# Patient Record
Sex: MALE | Race: WHITE | NOT HISPANIC OR LATINO | Employment: FULL TIME | URBAN - NONMETROPOLITAN AREA
[De-identification: names, ages, dates, MRNs, and addresses within clinical notes are randomized per-mention and may not be internally consistent; named-entity substitution may affect disease eponyms.]

---

## 2018-02-21 ENCOUNTER — HOSPITAL ENCOUNTER (OUTPATIENT)
Dept: RADIOLOGY | Facility: HOSPITAL | Age: 18
Discharge: HOME | End: 2018-02-21
Attending: PREVENTIVE MEDICINE
Payer: COMMERCIAL

## 2018-02-21 ENCOUNTER — TRANSCRIBE ORDERS (OUTPATIENT)
Dept: ADMINISTRATIVE | Facility: HOSPITAL | Age: 18
End: 2018-02-21

## 2018-02-21 DIAGNOSIS — Z00.8 SPECIAL EXAMINATIONS: Primary | ICD-10-CM

## 2018-02-21 DIAGNOSIS — Z00.8 SPECIAL EXAMINATIONS: ICD-10-CM

## 2018-02-21 PROCEDURE — 71046 X-RAY EXAM CHEST 2 VIEWS: CPT

## 2018-02-22 ENCOUNTER — TRANSCRIBE ORDERS (OUTPATIENT)
Dept: URGENT CARE | Facility: CLINIC | Age: 18
End: 2018-02-22

## 2020-08-13 ENCOUNTER — HOSPITAL ENCOUNTER (EMERGENCY)
Facility: HOSPITAL | Age: 20
Discharge: HOME | End: 2020-08-13
Attending: EMERGENCY MEDICINE | Admitting: EMERGENCY MEDICINE
Payer: COMMERCIAL

## 2020-08-13 VITALS
HEIGHT: 71 IN | SYSTOLIC BLOOD PRESSURE: 137 MMHG | RESPIRATION RATE: 18 BRPM | BODY MASS INDEX: 29.85 KG/M2 | HEART RATE: 106 BPM | TEMPERATURE: 97.6 F | WEIGHT: 213.19 LBS | OXYGEN SATURATION: 98 % | DIASTOLIC BLOOD PRESSURE: 65 MMHG

## 2020-08-13 DIAGNOSIS — M54.9 BACK PAIN: Primary | ICD-10-CM

## 2020-08-13 PROCEDURE — 99283 EMERGENCY DEPT VISIT LOW MDM: CPT

## 2020-08-13 PROCEDURE — 99284 EMERGENCY DEPT VISIT MOD MDM: CPT | Performed by: EMERGENCY MEDICINE

## 2020-08-13 RX ORDER — DIAZEPAM 5 MG/1
5-10 TABLET ORAL EVERY 6 HOURS PRN
Qty: 15 TABLET | Refills: 0 | Status: SHIPPED | OUTPATIENT
Start: 2020-08-13 | End: 2020-08-23

## 2020-08-13 RX ORDER — CYCLOBENZAPRINE HCL 10 MG
10 TABLET ORAL DAILY
COMMUNITY

## 2020-08-13 RX ORDER — NAPROXEN 375 MG/1
500 TABLET ORAL DAILY PRN
COMMUNITY

## 2020-08-13 RX ORDER — MORPHINE SULFATE 15 MG/1
15 TABLET ORAL EVERY 8 HOURS PRN
Qty: 6 TABLET | Refills: 0 | Status: SHIPPED | OUTPATIENT
Start: 2020-08-13

## 2020-08-13 NOTE — Clinical Note
Jovana Horne was seen and treated in our emergency department on 8/13/2020  Diagnosis:     Teresa Singh    He may return on 08/17/2020  If you have any questions or concerns, please don't hesitate to call        Kaela Kent DO    ______________________________           _______________          _______________  Hospital Representative                              Date                                Time

## 2020-08-13 NOTE — Clinical Note
Lory Bernstein was seen and treated in our emergency department on 8/13/2020  Diagnosis:     Beryle Eldon    He may return on 08/17/2020  If you have any questions or concerns, please don't hesitate to call        Megan Mondragon, DO    ______________________________           _______________          _______________  Hospital Representative                              Date                                Time

## 2020-08-14 NOTE — ED PROVIDER NOTES
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 Taking? PREDNISONE PO 8/13/2020 at Unknown time  Yes Yes   Sig: Take by mouth   cyclobenzaprine (FLEXERIL) 10 mg tablet 8/13/2020 at Unknown time  Yes Yes   Sig: Take 10 mg by mouth daily   naproxen (NAPROSYN) 375 mg tablet 8/13/2020 at Unknown time  Yes Yes   Sig: Take 500 mg by mouth daily as needed for mild pain      Facility-Administered Medications: None       History reviewed  No pertinent past medical history  History reviewed  No pertinent surgical history  History reviewed  No pertinent family history  I have reviewed and agree with the history as documented      E-Cigarette/Vaping    E-Cigarette Use Never User      E-Cigarette/Vaping Substances     Social History     Tobacco Use    Smoking status: Never Smoker    Smokeless tobacco: Never Used   Substance Use Topics    Alcohol use: Never Frequency: Never    Drug use: Never       Review of Systems   Constitutional: Negative for fever  Respiratory: Negative for shortness of breath  Cardiovascular: Negative for chest pain  Gastrointestinal: Negative for abdominal pain, diarrhea, nausea and vomiting  Skin: Negative for rash  Neurological: Negative for headaches  All other systems reviewed and are negative  Physical Exam  Physical Exam  Vitals signs and nursing note reviewed  Constitutional:       Comments: Pleasant, comfortable-appearing   HENT:      Head: Normocephalic and atraumatic  Eyes:      Conjunctiva/sclera: Conjunctivae normal       Pupils: Pupils are equal, round, and reactive to light  Neck:      Musculoskeletal: Neck supple  Cardiovascular:      Rate and Rhythm: Normal rate and regular rhythm  Heart sounds: Normal heart sounds  Pulmonary:      Effort: Pulmonary effort is normal       Breath sounds: Normal breath sounds  Abdominal:      General: Bowel sounds are normal  There is no distension  Palpations: Abdomen is soft  Tenderness: There is no abdominal tenderness  Musculoskeletal: Normal range of motion  Comments: Bilateral thoracolumbar junction area para spinal muscular tenderness, no spinal tenderness or deformity   Skin:     General: Skin is warm and dry  Neurological:      Mental Status: He is alert and oriented to person, place, and time  Cranial Nerves: No cranial nerve deficit  Coordination: Coordination normal       Comments: Deep tendon reflexes 2/4 at knees and ankles bilaterally  Intact saddle area sensation   Psychiatric:         Behavior: Behavior normal          Thought Content:  Thought content normal          Judgment: Judgment normal          Vital Signs  ED Triage Vitals [08/13/20 2003]   Temperature Pulse Respirations Blood Pressure SpO2   97 6 °F (36 4 °C) (!) 112 20 150/70 100 %      Temp Source Heart Rate Source Patient Position - Orthostatic VS BP Location FiO2 (%)   Temporal Monitor Lying Right arm --      Pain Score       9           Vitals:    08/13/20 2003 08/13/20 2030   BP: 150/70 137/65   Pulse: (!) 112 (!) 106   Patient Position - Orthostatic VS: Lying Lying         Visual Acuity      ED Medications  Medications - No data to display    Diagnostic Studies  Results Reviewed     None                 No orders to display              Procedures  Procedures         ED Course       US AUDIT      Most Recent Value   Initial Alcohol Screen: US AUDIT-C    1  How often do you have a drink containing alcohol?  0 Filed at: 08/13/2020 2003   2  How many drinks containing alcohol do you have on a typical day you are drinking? 0 Filed at: 08/13/2020 2003   3a  Male UNDER 65: How often do you have five or more drinks on one occasion? 0 Filed at: 08/13/2020 2003   3b  FEMALE Any Age, or MALE 65+: How often do you have 4 or more drinks on one occassion? 0 Filed at: 08/13/2020 2003   Audit-C Score  0 Filed at: 08/13/2020 2003                  WHITNEY/DAST-10      Most Recent Value   How many times in the past year have you    Used an illegal drug or used a prescription medication for non-medical reasons? Never Filed at: 08/13/2020 2003                                MDM      Disposition  Final diagnoses:   Back pain     Time reflects when diagnosis was documented in both MDM as applicable and the Disposition within this note     Time User Action Codes Description Comment    8/13/2020  8:32 PM Jennifer Brunson Add [M54 9] Back pain       ED Disposition     ED Disposition Condition Date/Time Comment    Discharge Stable Thu Aug 13, 2020  8:32 PM Rain Brown discharge to home/self care              Follow-up Information     Follow up With Specialties Details Why Contact Info    Janie Fuchs DO Family Medicine Schedule an appointment as soon as possible for a visit in 1 week  [facility]  [phone]            Discharge Medication List as of 8/13/2020  8:42 PM      START taking these medications    Details   diazepam (VALIUM) 5 mg tablet Take 1-2 tablets (5-10 mg total) by mouth every 6 (six) hours as needed for muscle spasms for up to 10 days, Starting Thu 8/13/2020, Until Sun 8/23/2020, Normal      morphine (MSIR) 15 mg tablet Take 1 tablet (15 mg total) by mouth every 8 (eight) hours as needed for severe pain for up to 6 dosesMax Daily Amount: 45 mg, Starting Thu 8/13/2020, Normal         CONTINUE these medications which have NOT CHANGED    Details   cyclobenzaprine (FLEXERIL) 10 mg tablet Take 10 mg by mouth daily, Historical Med      naproxen (NAPROSYN) 375 mg tablet Take 500 mg by mouth daily as needed for mild pain, Historical Med      PREDNISONE PO Take by mouth, Historical Med           No discharge procedures on file      PDMP Review     None          ED Provider  Electronically Signed by           Huey Salgado DO  08/13/20 7535

## 2020-08-14 NOTE — ED NOTES
Pt ambulates with a steady gait   Verbalizes understanding of discharge instructions provided by dr Emerald Bustos, RN  08/13/20 2044

## 2024-03-04 ENCOUNTER — APPOINTMENT (EMERGENCY)
Dept: CT IMAGING | Facility: HOSPITAL | Age: 24
End: 2024-03-04
Payer: COMMERCIAL

## 2024-03-04 ENCOUNTER — HOSPITAL ENCOUNTER (EMERGENCY)
Facility: HOSPITAL | Age: 24
Discharge: HOME | End: 2024-03-04
Attending: EMERGENCY MEDICINE | Admitting: EMERGENCY MEDICINE
Payer: COMMERCIAL

## 2024-03-04 ENCOUNTER — APPOINTMENT (EMERGENCY)
Dept: RADIOLOGY | Facility: HOSPITAL | Age: 24
End: 2024-03-04
Payer: COMMERCIAL

## 2024-03-04 VITALS
HEART RATE: 110 BPM | RESPIRATION RATE: 16 BRPM | SYSTOLIC BLOOD PRESSURE: 101 MMHG | OXYGEN SATURATION: 98 % | WEIGHT: 217.15 LBS | TEMPERATURE: 98.6 F | HEIGHT: 69 IN | DIASTOLIC BLOOD PRESSURE: 51 MMHG | BODY MASS INDEX: 32.16 KG/M2

## 2024-03-04 DIAGNOSIS — R19.7 DIARRHEA: Primary | ICD-10-CM

## 2024-03-04 DIAGNOSIS — R11.2 NAUSEA & VOMITING: ICD-10-CM

## 2024-03-04 DIAGNOSIS — R10.9 ABDOMINAL PAIN: ICD-10-CM

## 2024-03-04 LAB
ALBUMIN SERPL BCP-MCNC: 4.1 G/DL (ref 3.5–5)
ALP SERPL-CCNC: 93 U/L (ref 34–104)
ALT SERPL W P-5'-P-CCNC: 26 U/L (ref 7–52)
ANION GAP SERPL CALCULATED.3IONS-SCNC: 8 MMOL/L
AST SERPL W P-5'-P-CCNC: 19 U/L (ref 13–39)
BASOPHILS # BLD AUTO: 0.02 THOUSANDS/ÂΜL (ref 0–0.1)
BASOPHILS NFR BLD AUTO: 0 % (ref 0–1)
BILIRUB SERPL-MCNC: 1.08 MG/DL (ref 0.2–1)
BUN SERPL-MCNC: 12 MG/DL (ref 5–25)
CALCIUM SERPL-MCNC: 8.8 MG/DL (ref 8.4–10.2)
CHLORIDE SERPL-SCNC: 110 MMOL/L (ref 96–108)
CO2 SERPL-SCNC: 21 MMOL/L (ref 21–32)
CREAT SERPL-MCNC: 1 MG/DL (ref 0.6–1.3)
EOSINOPHIL # BLD AUTO: 0.08 THOUSAND/ÂΜL (ref 0–0.61)
EOSINOPHIL NFR BLD AUTO: 1 % (ref 0–6)
ERYTHROCYTE [DISTWIDTH] IN BLOOD BY AUTOMATED COUNT: 12.1 % (ref 11.6–15.1)
GFR SERPL CREATININE-BSD FRML MDRD: 104 ML/MIN/1.73SQ M
GLUCOSE SERPL-MCNC: 103 MG/DL (ref 65–140)
HCT VFR BLD AUTO: 44.1 % (ref 36.5–49.3)
HGB BLD-MCNC: 15.1 G/DL (ref 12–17)
IMM GRANULOCYTES # BLD AUTO: 0.03 THOUSAND/UL (ref 0–0.2)
IMM GRANULOCYTES NFR BLD AUTO: 0 % (ref 0–2)
LIPASE SERPL-CCNC: 14 U/L (ref 11–82)
LYMPHOCYTES # BLD AUTO: 0.57 THOUSANDS/ÂΜL (ref 0.6–4.47)
LYMPHOCYTES NFR BLD AUTO: 6 % (ref 14–44)
MCH RBC QN AUTO: 30.9 PG (ref 26.8–34.3)
MCHC RBC AUTO-ENTMCNC: 34.2 G/DL (ref 31.4–37.4)
MCV RBC AUTO: 90 FL (ref 82–98)
MONOCYTES # BLD AUTO: 0.38 THOUSAND/ÂΜL (ref 0.17–1.22)
MONOCYTES NFR BLD AUTO: 4 % (ref 4–12)
NEUTROPHILS # BLD AUTO: 7.76 THOUSANDS/ÂΜL (ref 1.85–7.62)
NEUTS SEG NFR BLD AUTO: 89 % (ref 43–75)
NRBC BLD AUTO-RTO: 0 /100 WBCS
PLATELET # BLD AUTO: 153 THOUSANDS/UL (ref 149–390)
PMV BLD AUTO: 11.3 FL (ref 8.9–12.7)
POTASSIUM SERPL-SCNC: 3.8 MMOL/L (ref 3.5–5.3)
PROT SERPL-MCNC: 6.7 G/DL (ref 6.4–8.4)
RBC # BLD AUTO: 4.88 MILLION/UL (ref 3.88–5.62)
SODIUM SERPL-SCNC: 139 MMOL/L (ref 135–147)
WBC # BLD AUTO: 8.84 THOUSAND/UL (ref 4.31–10.16)

## 2024-03-04 PROCEDURE — 99285 EMERGENCY DEPT VISIT HI MDM: CPT | Performed by: EMERGENCY MEDICINE

## 2024-03-04 PROCEDURE — 74022 RADEX COMPL AQT ABD SERIES: CPT

## 2024-03-04 PROCEDURE — 96374 THER/PROPH/DIAG INJ IV PUSH: CPT

## 2024-03-04 PROCEDURE — 83690 ASSAY OF LIPASE: CPT | Performed by: EMERGENCY MEDICINE

## 2024-03-04 PROCEDURE — 80053 COMPREHEN METABOLIC PANEL: CPT | Performed by: EMERGENCY MEDICINE

## 2024-03-04 PROCEDURE — 85025 COMPLETE CBC W/AUTO DIFF WBC: CPT | Performed by: EMERGENCY MEDICINE

## 2024-03-04 PROCEDURE — 96375 TX/PRO/DX INJ NEW DRUG ADDON: CPT

## 2024-03-04 PROCEDURE — 96361 HYDRATE IV INFUSION ADD-ON: CPT

## 2024-03-04 PROCEDURE — 36415 COLL VENOUS BLD VENIPUNCTURE: CPT | Performed by: EMERGENCY MEDICINE

## 2024-03-04 PROCEDURE — 99284 EMERGENCY DEPT VISIT MOD MDM: CPT

## 2024-03-04 PROCEDURE — 74177 CT ABD & PELVIS W/CONTRAST: CPT

## 2024-03-04 RX ORDER — ONDANSETRON 2 MG/ML
4 INJECTION INTRAMUSCULAR; INTRAVENOUS ONCE
Status: COMPLETED | OUTPATIENT
Start: 2024-03-04 | End: 2024-03-04

## 2024-03-04 RX ORDER — OXYCODONE HYDROCHLORIDE AND ACETAMINOPHEN 5; 325 MG/1; MG/1
1 TABLET ORAL EVERY 8 HOURS PRN
Qty: 12 TABLET | Refills: 0 | Status: SHIPPED | OUTPATIENT
Start: 2024-03-04 | End: 2024-03-08

## 2024-03-04 RX ORDER — LOPERAMIDE HYDROCHLORIDE 2 MG/1
2 CAPSULE ORAL 4 TIMES DAILY PRN
Qty: 12 CAPSULE | Refills: 0 | Status: SHIPPED | OUTPATIENT
Start: 2024-03-04

## 2024-03-04 RX ORDER — DROPERIDOL 2.5 MG/ML
1 INJECTION, SOLUTION INTRAMUSCULAR; INTRAVENOUS ONCE
Status: COMPLETED | OUTPATIENT
Start: 2024-03-04 | End: 2024-03-04

## 2024-03-04 RX ORDER — DICYCLOMINE HCL 20 MG
20 TABLET ORAL 2 TIMES DAILY
Qty: 20 TABLET | Refills: 0 | Status: SHIPPED | OUTPATIENT
Start: 2024-03-04

## 2024-03-04 RX ORDER — FAMOTIDINE 10 MG/ML
20 INJECTION, SOLUTION INTRAVENOUS ONCE
Status: COMPLETED | OUTPATIENT
Start: 2024-03-04 | End: 2024-03-04

## 2024-03-04 RX ORDER — ONDANSETRON 2 MG/ML
1 INJECTION INTRAMUSCULAR; INTRAVENOUS ONCE
Status: COMPLETED | OUTPATIENT
Start: 2024-03-04 | End: 2024-03-04

## 2024-03-04 RX ORDER — PANTOPRAZOLE SODIUM 40 MG/1
40 TABLET, DELAYED RELEASE ORAL DAILY
COMMUNITY
Start: 2023-12-19

## 2024-03-04 RX ORDER — HYDROMORPHONE HCL/PF 1 MG/ML
1 SYRINGE (ML) INJECTION ONCE
Status: COMPLETED | OUTPATIENT
Start: 2024-03-04 | End: 2024-03-04

## 2024-03-04 RX ORDER — ONDANSETRON 4 MG/1
4 TABLET, FILM COATED ORAL EVERY 6 HOURS
Qty: 20 TABLET | Refills: 0 | Status: SHIPPED | OUTPATIENT
Start: 2024-03-04

## 2024-03-04 RX ORDER — DROPERIDOL 2.5 MG/ML
0.62 INJECTION, SOLUTION INTRAMUSCULAR; INTRAVENOUS ONCE
Status: COMPLETED | OUTPATIENT
Start: 2024-03-04 | End: 2024-03-04

## 2024-03-04 RX ADMIN — FAMOTIDINE 20 MG: 10 INJECTION, SOLUTION INTRAVENOUS at 09:38

## 2024-03-04 RX ADMIN — SODIUM CHLORIDE 1000 ML: 0.9 INJECTION, SOLUTION INTRAVENOUS at 09:18

## 2024-03-04 RX ADMIN — ONDANSETRON 4 MG: 2 INJECTION INTRAMUSCULAR; INTRAVENOUS at 09:22

## 2024-03-04 RX ADMIN — MORPHINE SULFATE 2 MG: 2 INJECTION, SOLUTION INTRAMUSCULAR; INTRAVENOUS at 09:38

## 2024-03-04 RX ADMIN — HYDROMORPHONE HYDROCHLORIDE 1 MG: 1 INJECTION, SOLUTION INTRAMUSCULAR; INTRAVENOUS; SUBCUTANEOUS at 10:33

## 2024-03-04 RX ADMIN — IOHEXOL 100 ML: 350 INJECTION, SOLUTION INTRAVENOUS at 12:03

## 2024-03-04 RX ADMIN — DROPERIDOL 0.62 MG: 2.5 INJECTION, SOLUTION INTRAMUSCULAR; INTRAVENOUS at 10:33

## 2024-03-04 NOTE — ED PROVIDER NOTES
History  Chief Complaint   Patient presents with    Vomiting     Patient presents via EMS with chills, vomiting, diarrhea abdominal pain starting this morning. Patient recently exposed to norovirus.     23-year-old male presents to the ED for evaluation of chills, nausea, vomiting and abdominal pain that began this morning.  Patient states that he has not had a fever however has severe nonradiating abdominal pain.  Patient states his pain is 10 out of 10 and admits to myalgia as well.  Recent exposure to norovirus and his family.        Prior to Admission Medications   Prescriptions Last Dose Informant Patient Reported? Taking?   PREDNISONE PO Not Taking  Yes No   Sig: Take by mouth   Patient not taking: Reported on 3/4/2024   cyclobenzaprine (FLEXERIL) 10 mg tablet Unknown  Yes No   Sig: Take 10 mg by mouth daily   diazepam (VALIUM) 5 mg tablet   No No   Sig: Take 1-2 tablets (5-10 mg total) by mouth every 6 (six) hours as needed for muscle spasms for up to 10 days   morphine (MSIR) 15 mg tablet Not Taking  No No   Sig: Take 1 tablet (15 mg total) by mouth every 8 (eight) hours as needed for severe pain for up to 6 dosesMax Daily Amount: 45 mg   Patient not taking: Reported on 3/4/2024   naproxen (NAPROSYN) 375 mg tablet Not Taking  Yes No   Sig: Take 500 mg by mouth daily as needed for mild pain   Patient not taking: Reported on 3/4/2024   pantoprazole (PROTONIX) 40 mg tablet 3/3/2024  Yes Yes   Sig: Take 40 mg by mouth daily      Facility-Administered Medications: None       History reviewed. No pertinent past medical history.    History reviewed. No pertinent surgical history.    History reviewed. No pertinent family history.  I have reviewed and agree with the history as documented.    E-Cigarette/Vaping    E-Cigarette Use Never User      E-Cigarette/Vaping Substances     Social History     Tobacco Use    Smoking status: Never    Smokeless tobacco: Never   Vaping Use    Vaping status: Never Used   Substance Use  Topics    Alcohol use: Never    Drug use: Never       Review of Systems   Constitutional:  Positive for chills. Negative for fever.   HENT:  Negative for ear pain and sore throat.    Eyes:  Negative for pain and visual disturbance.   Respiratory:  Negative for cough and shortness of breath.    Cardiovascular:  Negative for chest pain and palpitations.   Gastrointestinal:  Positive for diarrhea, nausea and vomiting. Negative for abdominal pain.   Genitourinary:  Negative for dysuria and hematuria.   Musculoskeletal:  Positive for myalgias. Negative for arthralgias and back pain.   Skin:  Negative for color change and rash.   Neurological:  Negative for seizures and syncope.   All other systems reviewed and are negative.      Physical Exam  Physical Exam  Vitals and nursing note reviewed.   Constitutional:       General: He is in acute distress.      Appearance: Normal appearance. He is well-developed and normal weight. He is ill-appearing.   HENT:      Head: Normocephalic and atraumatic.      Right Ear: External ear normal.      Left Ear: External ear normal.      Nose: Nose normal. No congestion or rhinorrhea.      Mouth/Throat:      Mouth: Mucous membranes are dry.   Eyes:      Extraocular Movements: Extraocular movements intact.      Conjunctiva/sclera: Conjunctivae normal.   Neck:      Comments: +bilateral paraspinal tenderness  Cardiovascular:      Rate and Rhythm: Normal rate and regular rhythm.      Heart sounds: No murmur heard.  Pulmonary:      Effort: Pulmonary effort is normal. No respiratory distress.      Breath sounds: Normal breath sounds.   Abdominal:      General: Abdomen is flat. Bowel sounds are normal.      Palpations: Abdomen is soft.      Tenderness: There is no abdominal tenderness.   Musculoskeletal:         General: No swelling. Normal range of motion.      Cervical back: Normal range of motion and neck supple. Tenderness present.   Skin:     General: Skin is warm and dry.      Capillary  Refill: Capillary refill takes less than 2 seconds.   Neurological:      General: No focal deficit present.      Mental Status: He is alert and oriented to person, place, and time.   Psychiatric:         Mood and Affect: Mood normal.         Vital Signs  ED Triage Vitals   Temperature Pulse Respirations Blood Pressure SpO2   03/04/24 0917 03/04/24 0915 03/04/24 0915 03/04/24 0915 03/04/24 0915   98.6 °F (37 °C) 93 20 122/60 97 %      Temp Source Heart Rate Source Patient Position - Orthostatic VS BP Location FiO2 (%)   03/04/24 0917 03/04/24 0915 03/04/24 0915 -- --   Temporal Monitor Lying        Pain Score       03/04/24 0914       8           Vitals:    03/04/24 1300 03/04/24 1315 03/04/24 1330 03/04/24 1400   BP: 104/51 115/57 107/54 101/51   Pulse: (!) 110 (!) 108 (!) 111 (!) 110   Patient Position - Orthostatic VS:   Lying          Visual Acuity      ED Medications  Medications   droperidol (FOR EMS ONLY) (INAPSINE) 2.5 mg/mL injection 2.5 mg (0 mg Does not apply Given to EMS 3/4/24 0915)   ondansetron (FOR EMS ONLY) (ZOFRAN) 4 mg/2 mL injection 4 mg (0 mg Does not apply Given to EMS 3/4/24 0915)   ondansetron (ZOFRAN) injection 4 mg (4 mg Intravenous Given 3/4/24 0922)   sodium chloride 0.9 % bolus 1,000 mL (0 mL Intravenous Stopped 3/4/24 1203)   morphine injection 2 mg (2 mg Intravenous Given 3/4/24 0938)   Famotidine (PF) (PEPCID) injection 20 mg (20 mg Intravenous Given 3/4/24 0938)   HYDROmorphone (DILAUDID) injection 1 mg (1 mg Intravenous Given 3/4/24 1033)   droperidol (INAPSINE) injection 0.625 mg (0.625 mg Intravenous Given 3/4/24 1033)   iohexol (OMNIPAQUE) 350 MG/ML injection (MULTI-DOSE) 100 mL (100 mL Intravenous Given 3/4/24 1203)       Diagnostic Studies  Results Reviewed       Procedure Component Value Units Date/Time    Lipase [[id number]]  (Normal) Collected: 03/04/24 0918    Lab Status: Final result Specimen: Blood from Arm, Right Updated: 03/04/24 0953     Lipase 14 u/L     CMP [[id number]]   (Abnormal) Collected: 03/04/24 0918    Lab Status: Final result Specimen: Blood from Arm, Right Updated: 03/04/24 0953     Sodium 139 mmol/L      Potassium 3.8 mmol/L      Chloride 110 mmol/L      CO2 21 mmol/L      ANION GAP 8 mmol/L      BUN 12 mg/dL      Creatinine 1.00 mg/dL      Glucose 103 mg/dL      Calcium 8.8 mg/dL      AST 19 U/L      ALT 26 U/L      Alkaline Phosphatase 93 U/L      Total Protein 6.7 g/dL      Albumin 4.1 g/dL      Total Bilirubin 1.08 mg/dL      eGFR 104 ml/min/1.73sq m     Narrative:      National Kid[employer] guidelines for Chronic Kidney Disease (CKD):     Stage 1 with normal or high GFR (GFR > 90 mL/min/1.73 square meters)    Stage 2 Mild CKD (GFR = 60-89 mL/min/1.73 square meters)    Stage 3A Moderate CKD (GFR = 45-59 mL/min/1.73 square meters)    Stage 3B Moderate CKD (GFR = 30-44 mL/min/1.73 square meters)    Stage 4 Severe CKD (GFR = 15-29 mL/min/1.73 square meters)    Stage 5 End Stage CKD (GFR <15 mL/min/1.73 square meters)  Note: GFR calculation is accurate only with a steady state creatinine    Stool Enteric Bacterial Panel by PCR [[id number]]     Lab Status: No result Specimen: Stool     CBC and differential [[id number]]  (Abnormal) Collected: 03/04/24 0918    Lab Status: Final result Specimen: Blood from Arm, Right Updated: 03/04/24 0923     WBC 8.84 Thousand/uL      RBC 4.88 Million/uL      Hemoglobin 15.1 g/dL      Hematocrit 44.1 %      MCV 90 fL      MCH 30.9 pg      MCHC 34.2 g/dL      RDW 12.1 %      MPV 11.3 fL      Platelets 153 Thousands/uL      nRBC 0 /100 WBCs      Neutrophils Relative 89 %      Immat GRANS % 0 %      Lymphocytes Relative 6 %      Monocytes Relative 4 %      Eosinophils Relative 1 %      Basophils Relative 0 %      Neutrophils Absolute 7.76 Thousands/µL      Immature Grans Absolute 0.03 Thousand/uL      Lymphocytes Absolute 0.57 Thousands/µL      Monocytes Absolute 0.38 Thousand/µL      Eosinophils Absolute 0.08 Thousand/µL       Basophils Absolute 0.02 Thousands/µL                    CT abdomen pelvis with contrast   Final Result by Franklin Almendarez MD (03/04 1248)      1.  No acute inflammatory process in the abdomen or pelvis. Mild fluid in distal small bowel and proximal colon is likely related to diarrheal illness.      2.  Submucosal fat deposition in the distal ileum/terminal ileum. This finding is nonspecific though can be seen in the setting of prior or chronic enteritis including inflammatory bowel disease though there is no evidence of active inflammation.         Workstation performed: [id number]         X-ray abdomen obstruction series   Final Result by Rolly Brower MD (03/04 1120)      Nonobstructive bowel gas pattern.         Workstation performed: [id number]                    Procedures  Procedures         ED Course  ED Course as of 03/04/24 2142   Mon Mar 04, 2024   1353 Patient feels improved and ready to be discharged                               SBIRT 22yo+      Flowsheet Row Most Recent Value   Initial Alcohol Screen: US AUDIT-C     1. How often do you have a drink containing alcohol? 0 Filed at: 03/04/2024 0915   2. How many drinks containing alcohol do you have on a typical day you are drinking?  0 Filed at: 03/04/2024 0915   3a. Male UNDER 65: How often do you have five or more drinks on one occasion? 0 Filed at: 03/04/2024 0915   3b. FEMALE Any Age, or MALE 65+: How often do you have 4 or more drinks on one occassion? 0 Filed at: 03/04/2024 0915   Audit-C Score 0 Filed at: 03/04/2024 0915   WHITNEY: How many times in the past year have you...    Used an illegal drug or used a prescription medication for non-medical reasons? Never Filed at: 03/04/2024 0915                      Medical Decision Making  23-year-old male presents to the ED for evaluation of chills, nausea, vomiting and abdominal pain that began this morning.  Patient states that he has not had a fever however has severe nonradiating abdominal  pain.  Patient states his pain is 10 out of 10 and admits to myalgia as well.  Recent exposure to norovirus and his family.  Differential diagnosis includes but not limited to gastroenteritis, gastritis, dehydration, electrolyte abnormality    Amount and/or Complexity of Data Reviewed  Labs: ordered.  Radiology: ordered.    Risk  Prescription drug management.             Disposition  Final diagnoses:   Diarrhea   Nausea & vomiting   Abdominal pain     Time reflects when diagnosis was documented in both MDM as applicable and the Disposition within this note       Time User Action Codes Description Comment    3/4/2024  1:53 PM ThorSonia larsenia Add [R19.7] Diarrhea     3/4/2024  1:53 PM Thorpe Dacandiia Add [R11.2] Nausea & vomiting     3/4/2024  1:54 PM ThorSonia larsenia Add [R10.9] Abdominal pain           ED Disposition       ED Disposition   Discharge    Condition   Stable    Date/Time   Mon Mar 4, 2024 1353    Comment   Jon Bang discharge to home/self care.                   Follow-up Information       Follow up With Specialties Details Why Contact Info    Kodak Santiago DO Family [address] [address] [phone]              Discharge Medication List as of 3/4/2024  1:56 PM        START taking these medications    Details   dicyclomine (BENTYL) 20 mg tablet Take 1 tablet (20 mg total) by mouth 2 (two) times a day, Starting Mon 3/4/2024, Normal      loperamide (IMODIUM) 2 mg capsule Take 1 capsule (2 mg total) by mouth 4 (four) times a day as needed for diarrhea, Starting Mon 3/4/2024, Normal      ondansetron (ZOFRAN) 4 mg tablet Take 1 tablet (4 mg total) by mouth every 6 (six) hours, Starting Mon 3/4/2024, Normal      oxyCODONE-acetaminophen (Percocet) 5-325 mg per tablet Take 1 tablet by mouth every 8 (eight) hours as needed for severe pain for up to 4 days Max Daily Amount: 3 tablets, Starting Mon 3/4/2024, Until Fri 3/8/2024 at 2359, Normal           CONTINUE these  medications which have NOT CHANGED    Details   pantoprazole (PROTONIX) 40 mg tablet Take 40 mg by mouth daily, Starting Tue 12/19/2023, Historical Med      cyclobenzaprine (FLEXERIL) 10 mg tablet Take 10 mg by mouth daily, Historical Med      diazepam (VALIUM) 5 mg tablet Take 1-2 tablets (5-10 mg total) by mouth every 6 (six) hours as needed for muscle spasms for up to 10 days, Starting Thu 8/13/2020, Until Sun 8/23/2020, Normal      morphine (MSIR) 15 mg tablet Take 1 tablet (15 mg total) by mouth every 8 (eight) hours as needed for severe pain for up to 6 dosesMax Daily Amount: 45 mg, Starting u 8/13/2020, Normal      naproxen (NAPROSYN) 375 mg tablet Take 500 mg by mouth daily as needed for mild pain, Historical Med      PREDNISONE PO Take by mouth, Historical Med             No discharge procedures on file.    PDMP Review       None            ED Provider  Electronically Signed by             Beth Gonzalez DO  03/04/24 9280

## 2024-03-04 NOTE — Clinical Note
accompanied Jon Bang to the emergency department on 3/4/2024.    Return date if applicable: 03/05/2024        If you have any questions or concerns, please don't hesitate to call.      Beth Gonzalez, DO

## 2024-03-04 NOTE — Clinical Note
Jon Bang was seen and treated in our emergency department on 3/4/2024.                Diagnosis:     Jon  may return to work on return date.    He may return on this date: 03/07/2024         If you have any questions or concerns, please don't hesitate to call.      Beth Gonzalez, DO    ______________________________           _______________          _______________  Hospital Representative                              Date                                Time

## 2024-12-02 ENCOUNTER — APPOINTMENT (EMERGENCY)
Dept: CT IMAGING | Facility: HOSPITAL | Age: 24
End: 2024-12-02
Payer: COMMERCIAL

## 2024-12-02 ENCOUNTER — HOSPITAL ENCOUNTER (EMERGENCY)
Facility: HOSPITAL | Age: 24
Discharge: HOME | End: 2024-12-02
Attending: EMERGENCY MEDICINE
Payer: COMMERCIAL

## 2024-12-02 VITALS
DIASTOLIC BLOOD PRESSURE: 56 MMHG | OXYGEN SATURATION: 98 % | BODY MASS INDEX: 31.01 KG/M2 | SYSTOLIC BLOOD PRESSURE: 105 MMHG | HEART RATE: 82 BPM | RESPIRATION RATE: 18 BRPM | WEIGHT: 210 LBS

## 2024-12-02 DIAGNOSIS — R55 NEAR SYNCOPE: Primary | ICD-10-CM

## 2024-12-02 LAB
ALBUMIN SERPL BCG-MCNC: 4.7 G/DL (ref 3.5–5)
ALP SERPL-CCNC: 108 U/L (ref 34–104)
ALT SERPL W P-5'-P-CCNC: 40 U/L (ref 7–52)
ANION GAP SERPL CALCULATED.3IONS-SCNC: 10 MMOL/L (ref 4–13)
AST SERPL W P-5'-P-CCNC: 33 U/L (ref 13–39)
BASOPHILS # BLD AUTO: 0.04 THOUSANDS/ΜL (ref 0–0.1)
BASOPHILS NFR BLD AUTO: 1 % (ref 0–1)
BILIRUB SERPL-MCNC: 0.65 MG/DL (ref 0.2–1)
BNP SERPL-MCNC: 18 PG/ML (ref 0–100)
BUN SERPL-MCNC: 12 MG/DL (ref 5–25)
CALCIUM SERPL-MCNC: 9.2 MG/DL (ref 8.4–10.2)
CARDIAC TROPONIN I PNL SERPL HS: <2 NG/L (ref ?–50)
CHLORIDE SERPL-SCNC: 104 MMOL/L (ref 96–108)
CO2 SERPL-SCNC: 25 MMOL/L (ref 21–32)
CREAT SERPL-MCNC: 1.1 MG/DL (ref 0.6–1.3)
EOSINOPHIL # BLD AUTO: 0.07 THOUSAND/ΜL (ref 0–0.61)
EOSINOPHIL NFR BLD AUTO: 1 % (ref 0–6)
ERYTHROCYTE [DISTWIDTH] IN BLOOD BY AUTOMATED COUNT: 12.3 % (ref 11.6–15.1)
GFR SERPL CREATININE-BSD FRML MDRD: 92 ML/MIN/1.73SQ M
GLUCOSE SERPL-MCNC: 135 MG/DL (ref 65–140)
GLUCOSE SERPL-MCNC: 137 MG/DL (ref 65–140)
HCT VFR BLD AUTO: 45.9 % (ref 36.5–49.3)
HGB BLD-MCNC: 15.6 G/DL (ref 12–17)
IMM GRANULOCYTES # BLD AUTO: 0.01 THOUSAND/UL (ref 0–0.2)
IMM GRANULOCYTES NFR BLD AUTO: 0 % (ref 0–2)
LYMPHOCYTES # BLD AUTO: 2.03 THOUSANDS/ΜL (ref 0.6–4.47)
LYMPHOCYTES NFR BLD AUTO: 31 % (ref 14–44)
MCH RBC QN AUTO: 31.3 PG (ref 26.8–34.3)
MCHC RBC AUTO-ENTMCNC: 34 G/DL (ref 31.4–37.4)
MCV RBC AUTO: 92 FL (ref 82–98)
MONOCYTES # BLD AUTO: 0.56 THOUSAND/ΜL (ref 0.17–1.22)
MONOCYTES NFR BLD AUTO: 9 % (ref 4–12)
NEUTROPHILS # BLD AUTO: 3.85 THOUSANDS/ΜL (ref 1.85–7.62)
NEUTS SEG NFR BLD AUTO: 58 % (ref 43–75)
NRBC BLD AUTO-RTO: 0 /100 WBCS
PLATELET # BLD AUTO: 220 THOUSANDS/UL (ref 149–390)
PMV BLD AUTO: 11.6 FL (ref 8.9–12.7)
POTASSIUM SERPL-SCNC: 3.5 MMOL/L (ref 3.5–5.3)
PROT SERPL-MCNC: 7.5 G/DL (ref 6.4–8.4)
RBC # BLD AUTO: 4.98 MILLION/UL (ref 3.88–5.62)
SODIUM SERPL-SCNC: 139 MMOL/L (ref 135–147)
WBC # BLD AUTO: 6.56 THOUSAND/UL (ref 4.31–10.16)

## 2024-12-02 PROCEDURE — 70450 CT HEAD/BRAIN W/O DYE: CPT

## 2024-12-02 PROCEDURE — 80053 COMPREHEN METABOLIC PANEL: CPT | Performed by: EMERGENCY MEDICINE

## 2024-12-02 PROCEDURE — 85025 COMPLETE CBC W/AUTO DIFF WBC: CPT | Performed by: EMERGENCY MEDICINE

## 2024-12-02 PROCEDURE — 82948 REAGENT STRIP/BLOOD GLUCOSE: CPT

## 2024-12-02 PROCEDURE — 83880 ASSAY OF NATRIURETIC PEPTIDE: CPT | Performed by: EMERGENCY MEDICINE

## 2024-12-02 PROCEDURE — 12001 RPR S/N/AX/GEN/TRNK 2.5CM/<: CPT | Performed by: EMERGENCY MEDICINE

## 2024-12-02 PROCEDURE — 36415 COLL VENOUS BLD VENIPUNCTURE: CPT | Performed by: EMERGENCY MEDICINE

## 2024-12-02 PROCEDURE — 84484 ASSAY OF TROPONIN QUANT: CPT | Performed by: EMERGENCY MEDICINE

## 2024-12-02 PROCEDURE — 99284 EMERGENCY DEPT VISIT MOD MDM: CPT | Performed by: EMERGENCY MEDICINE

## 2024-12-02 PROCEDURE — 96360 HYDRATION IV INFUSION INIT: CPT

## 2024-12-02 PROCEDURE — 96361 HYDRATE IV INFUSION ADD-ON: CPT

## 2024-12-02 PROCEDURE — 99284 EMERGENCY DEPT VISIT MOD MDM: CPT

## 2024-12-02 PROCEDURE — 93005 ELECTROCARDIOGRAM TRACING: CPT

## 2024-12-02 RX ADMIN — SODIUM CHLORIDE 2000 ML: 0.9 INJECTION, SOLUTION INTRAVENOUS at 20:36

## 2024-12-02 NOTE — Clinical Note
Jon Bang was seen and treated in our emergency department on 12/2/2024.    No restrictions            Diagnosis:     Jon  may return to work on return date.    He may return on this date: 12/03/2024         If you have any questions or concerns, please don't hesitate to call.      Albert Loredo, DO    ______________________________           _______________          _______________  Hospital Representative                              Date                                Time

## 2024-12-03 LAB
ATRIAL RATE: 71 BPM
P AXIS: 30 DEGREES
PR INTERVAL: 160 MS
QRS AXIS: 51 DEGREES
QRSD INTERVAL: 96 MS
QT INTERVAL: 398 MS
QTC INTERVAL: 432 MS
T WAVE AXIS: 37 DEGREES
VENTRICULAR RATE: 71 BPM

## 2024-12-03 PROCEDURE — 93010 ELECTROCARDIOGRAM REPORT: CPT | Performed by: INTERNAL MEDICINE

## 2024-12-03 NOTE — ED PROVIDER NOTES
Time reflects when diagnosis was documented in both MDM as applicable and the Disposition within this note       Time User Action Codes Description Comment    12/2/2024  9:10 PM Albert Loredo Add [R55] Near syncope           ED Disposition       ED Disposition   Discharge    Condition   Stable    Date/Time   Mon Dec 2, 2024 10:01 PM    Comment   Jon Bang discharge to home/self care.                   Assessment & Plan       Medical Decision Making  24-year-old male presents to the emergency department after syncopal episode.  Differential diagnosis include benign causes, vasovagal syncope, cardiac dysrhythmia, abnormal intervals, Brugada, LVH, ACS, will perform EKG, basic labs, provide fluids, and reassess. Will perform CT head, patient complains of headache, right sided, patient feeling dizzy. Will r/o intracranial process.    Discussed results with the patient.  He will follow-up with primary care doctor in 24 to 48 hours.  Strict return precautions given.  Patient understands and agrees with treatment plan.      Amount and/or Complexity of Data Reviewed  Labs: ordered.  Radiology: ordered.             Medications       ED Risk Strat Scores                           SBIRT 20yo+      Flowsheet Row Most Recent Value   Initial Alcohol Screen: US AUDIT-C     1. How often do you have a drink containing alcohol? 0 Filed at: 12/02/2024 2019   2. How many drinks containing alcohol do you have on a typical day you are drinking?  0 Filed at: 12/02/2024 2019   3a. Male UNDER 65: How often do you have five or more drinks on one occasion? 0 Filed at: 12/02/2024 2019   3b. FEMALE Any Age, or MALE 65+: How often do you have 4 or more drinks on one occassion? 0 Filed at: 12/02/2024 2019   Audit-C Score 0 Filed at: 12/02/2024 2019                            History of Present Illness       Chief Complaint   Patient presents with    Syncope     Had a witnessed syncopal episode, became diaphoretic and pale.        History  reviewed. No pertinent past medical history.   History reviewed. No pertinent surgical history.   History reviewed. No pertinent family history.   Social History     Tobacco Use    Smoking status: Never    Smokeless tobacco: Never   Vaping Use    Vaping status: Never Used   Substance Use Topics    Alcohol use: Never    Drug use: Never      E-Cigarette/Vaping    E-Cigarette Use Never User       E-Cigarette/Vaping Substances      I have reviewed and agree with the history as documented.     24-year-old male presents to the emergency department for evaluation of syncope.  Patient came to the emergency department originally for a laceration on his right parietal scalp.  Patient was closing the trunk, hit the parietal aspect of his head, sustained a 1 cm laceration, patient denies LOC, blood thinners, on repair of the laceration, patient had a syncope episode shortly after due to the pain.  Patient returned to baseline immediately after couple of seconds.  Patient denies any chest pain, shortness of breath, chills, fevers, or other medical complaints at this time.  Patient states that his vision went black patient visibly went pale, and then return to baseline, well-perfused and alert.  Patient does report slight headache and dizziness. Patient ambulating in the emergency department without difficulty or assistance.       Syncope  Associated symptoms: headaches    Associated symptoms: no chest pain, no fever, no palpitations, no seizures, no shortness of breath and no vomiting        Review of Systems   Constitutional:  Negative for chills and fever.   HENT:  Negative for ear pain and sore throat.    Eyes:  Negative for pain and visual disturbance.   Respiratory:  Negative for cough and shortness of breath.    Cardiovascular:  Positive for syncope. Negative for chest pain and palpitations.   Gastrointestinal:  Negative for abdominal pain and vomiting.   Genitourinary:  Negative for dysuria and hematuria.    Musculoskeletal:  Negative for arthralgias and back pain.   Skin:  Positive for wound. Negative for color change and rash.   Neurological:  Positive for syncope and headaches. Negative for seizures.   All other systems reviewed and are negative.          Objective       ED Triage Vitals   Temp Pulse Blood Pressure Respirations SpO2 Patient Position - Orthostatic VS   -- 12/02/24 2018 12/02/24 2018 12/02/24 2018 12/02/24 2018 12/02/24 2018    82 105/56 18 98 % Sitting      Temp src Heart Rate Source BP Location FiO2 (%) Pain Score    -- 12/02/24 2018 12/02/24 2018 -- 12/02/24 2040     Monitor Left arm  6      Vitals      Date and Time Temp Pulse SpO2 Resp BP Pain Score FACES Pain Rating User   12/02/24 2040 -- -- -- -- -- 6 -- BA   12/02/24 2018 -- 82 98 % 18 105/56 -- -- SV            Physical Exam  Vitals and nursing note reviewed.   Constitutional:       General: He is not in acute distress.     Appearance: He is well-developed.   HENT:      Head: Normocephalic and atraumatic.   Eyes:      Conjunctiva/sclera: Conjunctivae normal.   Cardiovascular:      Rate and Rhythm: Normal rate and regular rhythm.      Heart sounds: No murmur heard.  Pulmonary:      Effort: Pulmonary effort is normal. No respiratory distress.      Breath sounds: Normal breath sounds.   Abdominal:      Palpations: Abdomen is soft.      Tenderness: There is no abdominal tenderness.   Musculoskeletal:         General: No swelling.      Cervical back: Neck supple.   Skin:     General: Skin is warm and dry.      Capillary Refill: Capillary refill takes less than 2 seconds.      Findings: Lesion present.   Neurological:      Mental Status: He is alert.   Psychiatric:         Mood and Affect: Mood normal.         Results Reviewed       Procedure Component Value Units Date/Time    HS Troponin 0hr (reflex protocol) [[id number]]  (Normal) Collected: 12/02/24 2041    Lab Status: Final result Specimen: Blood from Arm, Left Updated: 12/02/24 2115     hs  TnI 0hr <2 ng/L     HS Troponin I 2hr [[id number]]     Lab Status: No result Specimen: Blood     B-Type Natriuretic Peptide(BNP) [[id number]]  (Normal) Collected: 12/02/24 2041    Lab Status: Final result Specimen: Blood from Arm, Left Updated: 12/02/24 2113     BNP 18 pg/mL     Comprehensive metabolic panel [[id number]]  (Abnormal) Collected: 12/02/24 2041    Lab Status: Final result Specimen: Blood from Arm, Left Updated: 12/02/24 2107     Sodium 139 mmol/L      Potassium 3.5 mmol/L      Chloride 104 mmol/L      CO2 25 mmol/L      ANION GAP 10 mmol/L      BUN 12 mg/dL      Creatinine 1.10 mg/dL      Glucose 137 mg/dL      Calcium 9.2 mg/dL      AST 33 U/L      ALT 40 U/L      Alkaline Phosphatase 108 U/L      Total Protein 7.5 g/dL      Albumin 4.7 g/dL      Total Bilirubin 0.65 mg/dL      eGFR 92 ml/min/1.73sq m     Narrative:      [employer] guidelines for Chronic Kidney Disease (CKD):     Stage 1 with normal or high GFR (GFR > 90 mL/min/1.73 square meters)    Stage 2 Mild CKD (GFR = 60-89 mL/min/1.73 square meters)    Stage 3A Moderate CKD (GFR = 45-59 mL/min/1.73 square meters)    Stage 3B Moderate CKD (GFR = 30-44 mL/min/1.73 square meters)    Stage 4 Severe CKD (GFR = 15-29 mL/min/1.73 square meters)    Stage 5 End Stage CKD (GFR <15 mL/min/1.73 square meters)  Note: GFR calculation is accurate only with a steady state creatinine    CBC and differential [[id number]] Collected: 12/02/24 2041    Lab Status: Final result Specimen: Blood from Arm, Left Updated: 12/02/24 2049     WBC 6.56 Thousand/uL      RBC 4.98 Million/uL      Hemoglobin 15.6 g/dL      Hematocrit 45.9 %      MCV 92 fL      MCH 31.3 pg      MCHC 34.0 g/dL      RDW 12.3 %      MPV 11.6 fL      Platelets 220 Thousands/uL      nRBC 0 /100 WBCs      Segmented % 58 %      Immature Grans % 0 %      Lymphocytes % 31 %      Monocytes % 9 %      Eosinophils Relative 1 %      Basophils Relative 1 %      Absolute Neutrophils 3.85  Thousands/µL      Absolute Immature Grans 0.01 Thousand/uL      Absolute Lymphocytes 2.03 Thousands/µL      Absolute Monocytes 0.56 Thousand/µL      Eosinophils Absolute 0.07 Thousand/µL      Basophils Absolute 0.04 Thousands/µL     Fingerstick Glucose (POCT) [[id number]]  (Normal) Collected: 12/02/24 2035    Lab Status: Final result Specimen: Blood Updated: 12/02/24 2037     POC Glucose 135 mg/dl             CT head without contrast   Final Interpretation by Christopher Oconnor DO (12/02 2159)      No acute intracranial abnormality.                  Workstation performed: [id number]             Universal Protocol:  procedure performed by consultantConsent: Verbal consent obtained.  Risks and benefits: risks, benefits and alternatives were discussed  Consent given by: patient  Timeout called at: 12/2/2024 9:15 PM.  Patient identity confirmed: verbally with patient, arm band and hospital-assigned identification number  Laceration repair    Date/Time: 12/2/2024 9:15 PM    Performed by: Albert Loredo DO  Authorized by: Albert Loredo DO  Laceration length: 1 cm    Wound Dehiscence:  Superficial Wound Dehiscence: simple closure      Procedure Details:  Irrigation solution: saline  Amount of cleaning: standard  Skin closure: staples  Number of sutures: 3  Technique: simple  Approximation: close  Approximation difficulty: simple  Patient tolerance: patient tolerated the procedure well with no immediate complications          ED Medication and Procedure Management   Prior to Admission Medications   Prescriptions Last Dose Informant Patient Reported? Taking?   PREDNISONE PO   Yes No   Sig: Take by mouth   Patient not taking: Reported on 3/4/2024   cyclobenzaprine (FLEXERIL) 10 mg tablet   Yes No   Sig: Take 10 mg by mouth daily   diazepam (VALIUM) 5 mg tablet   No No   Sig: Take 1-2 tablets (5-10 mg total) by mouth every 6 (six) hours as needed for muscle spasms for up to 10 days   dicyclomine  (BENTYL) 20 mg tablet   No No   Sig: Take 1 tablet (20 mg total) by mouth 2 (two) times a day   loperamide (IMODIUM) 2 mg capsule   No No   Sig: Take 1 capsule (2 mg total) by mouth 4 (four) times a day as needed for diarrhea   morphine (MSIR) 15 mg tablet   No No   Sig: Take 1 tablet (15 mg total) by mouth every 8 (eight) hours as needed for severe pain for up to 6 dosesMax Daily Amount: 45 mg   Patient not taking: Reported on 3/4/2024   naproxen (NAPROSYN) 375 mg tablet   Yes No   Sig: Take 500 mg by mouth daily as needed for mild pain   Patient not taking: Reported on 3/4/2024   ondansetron (ZOFRAN) 4 mg tablet   No No   Sig: Take 1 tablet (4 mg total) by mouth every 6 (six) hours   pantoprazole (PROTONIX) 40 mg tablet   Yes No   Sig: Take 40 mg by mouth daily      Facility-Administered Medications: None     Discharge Medication List as of 12/2/2024  9:17 PM        CONTINUE these medications which have NOT CHANGED    Details   cyclobenzaprine (FLEXERIL) 10 mg tablet Take 10 mg by mouth daily, Historical Med      diazepam (VALIUM) 5 mg tablet Take 1-2 tablets (5-10 mg total) by mouth every 6 (six) hours as needed for muscle spasms for up to 10 days, Starting u 8/13/2020, Until Sun 8/23/2020, Normal      dicyclomine (BENTYL) 20 mg tablet Take 1 tablet (20 mg total) by mouth 2 (two) times a day, Starting Mon 3/4/2024, Normal      loperamide (IMODIUM) 2 mg capsule Take 1 capsule (2 mg total) by mouth 4 (four) times a day as needed for diarrhea, Starting Mon 3/4/2024, Normal      morphine (MSIR) 15 mg tablet Take 1 tablet (15 mg total) by mouth every 8 (eight) hours as needed for severe pain for up to 6 dosesMax Daily Amount: 45 mg, Starting u 8/13/2020, Normal      naproxen (NAPROSYN) 375 mg tablet Take 500 mg by mouth daily as needed for mild pain, Historical Med      ondansetron (ZOFRAN) 4 mg tablet Take 1 tablet (4 mg total) by mouth every 6 (six) hours, Starting Mon 3/4/2024, Normal      pantoprazole  (PROTONIX) 40 mg tablet Take 40 mg by mouth daily, Starting Tue 12/19/2023, Historical Med      PREDNISONE PO Take by mouth, Historical Med           No discharge procedures on file.  ED SEPSIS DOCUMENTATION   Time reflects when diagnosis was documented in both MDM as applicable and the Disposition within this note       Time User Action Codes Description Comment    12/2/2024  9:10 PM Albert Loredo Add [R55] Near syncope                  Albert Loredo, DO  12/02/24 2117       Albert Loredo, DO  12/02/24 2117       Albert Loredo, DO  12/02/24 2223

## 2024-12-03 NOTE — DISCHARGE INSTRUCTIONS
You were seen in the emergency department for syncope, your blood work, and EKG were normal and unremarkable.  Please follow-up with your primary care doctor in 24 to 48 hours.  If your symptoms worsen or persist, please return to the emergency department immediately.     Please keep the wound clean and dry for the next 24 to 48 hours.  If you experience any redness, swelling, discharge, pain or proportion, or any other medical complaint or concern, please return to the emergency department immediately.  Please have the staples removed in 7 days.